# Patient Record
Sex: MALE | Race: WHITE | NOT HISPANIC OR LATINO | Employment: FULL TIME | URBAN - METROPOLITAN AREA
[De-identification: names, ages, dates, MRNs, and addresses within clinical notes are randomized per-mention and may not be internally consistent; named-entity substitution may affect disease eponyms.]

---

## 2023-08-20 ENCOUNTER — HOSPITAL ENCOUNTER (EMERGENCY)
Facility: CLINIC | Age: 28
Discharge: HOME | End: 2023-08-20
Attending: PHYSICIAN ASSISTANT | Admitting: PHYSICIAN ASSISTANT

## 2023-08-20 VITALS
HEART RATE: 66 BPM | WEIGHT: 135 LBS | TEMPERATURE: 97.8 F | OXYGEN SATURATION: 99 % | RESPIRATION RATE: 18 BRPM | DIASTOLIC BLOOD PRESSURE: 75 MMHG | SYSTOLIC BLOOD PRESSURE: 126 MMHG

## 2023-08-20 DIAGNOSIS — S81.812A LACERATION OF LEFT LOWER EXTREMITY, INITIAL ENCOUNTER: ICD-10-CM

## 2023-08-20 PROCEDURE — 12032 INTMD RPR S/A/T/EXT 2.6-7.5: CPT | Performed by: PHYSICIAN ASSISTANT

## 2023-08-20 PROCEDURE — 99283 EMERGENCY DEPT VISIT LOW MDM: CPT | Mod: 25 | Performed by: PHYSICIAN ASSISTANT

## 2023-08-20 PROCEDURE — 99283 EMERGENCY DEPT VISIT LOW MDM: CPT | Mod: 57 | Performed by: PHYSICIAN ASSISTANT

## 2023-08-20 ASSESSMENT — ACTIVITIES OF DAILY LIVING (ADL): ADLS_ACUITY_SCORE: 35

## 2023-08-21 NOTE — ED NOTES
Reviewed discharge instruction, verbalized understanding. No questions or concerns. Wound care and education completed. Pt stable and discharged ambulatory.

## 2023-08-21 NOTE — DISCHARGE INSTRUCTIONS
Wash the wound with warm soapy water in the shower.  Cover it with a bandage and antibiotic ointment.  Do this 1-2 times daily.  Do not go swimming with the wound or soak the wound under water until stitches are taken out.  Have the stitches taken out in 2 weeks.  You can take ibuprofen or Tylenol for pain.  If you have any worsening symptoms please return to the emergency department.    Thank you for choosing [facility]'s Emergency Department. It was a pleasure taking care of you today. If you have any questions, please call [phone].    Antoinette Leung PA-C
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
## 2023-08-21 NOTE — ED NOTES
Pt pale, diaphoretic - RN brought pt to room to lay down. Pt c/o pain and nausea from pain. RN applied sterile gauze and coban. Waiting for provider to assess.